# Patient Record
Sex: FEMALE | URBAN - METROPOLITAN AREA
[De-identification: names, ages, dates, MRNs, and addresses within clinical notes are randomized per-mention and may not be internally consistent; named-entity substitution may affect disease eponyms.]

---

## 2024-11-05 ENCOUNTER — TELEPHONE (OUTPATIENT)
Dept: SPORTS MEDICINE | Facility: CLINIC | Age: 24
End: 2024-11-05
Payer: OTHER MISCELLANEOUS

## 2024-11-05 NOTE — TELEPHONE ENCOUNTER
----- Message from Malick sent at 11/5/2024  8:18 AM CST -----  Regarding: Workman's Comp  Contact: [phone]  Pt is calling in ref to discussion she had with someone in provider's office to schedule a workman's comp appt. Pt says she is checking the status on being seen by provider. Patient Requesting Call Back @ [phone]